# Patient Record
Sex: FEMALE | URBAN - METROPOLITAN AREA
[De-identification: names, ages, dates, MRNs, and addresses within clinical notes are randomized per-mention and may not be internally consistent; named-entity substitution may affect disease eponyms.]

---

## 2024-02-27 ENCOUNTER — APPOINTMENT (RX ONLY)
Dept: URBAN - METROPOLITAN AREA CLINIC 292 | Facility: CLINIC | Age: 29
Setting detail: DERMATOLOGY
End: 2024-02-27

## 2024-02-27 DIAGNOSIS — D22 MELANOCYTIC NEVI: ICD-10-CM

## 2024-02-27 DIAGNOSIS — L81.4 OTHER MELANIN HYPERPIGMENTATION: ICD-10-CM | Status: STABLE

## 2024-02-27 DIAGNOSIS — L21.8 OTHER SEBORRHEIC DERMATITIS: ICD-10-CM

## 2024-02-27 DIAGNOSIS — Z80.8 FAMILY HISTORY OF MALIGNANT NEOPLASM OF OTHER ORGANS OR SYSTEMS: ICD-10-CM

## 2024-02-27 PROBLEM — D22.62 MELANOCYTIC NEVI OF LEFT UPPER LIMB, INCLUDING SHOULDER: Status: ACTIVE | Noted: 2024-02-27

## 2024-02-27 PROCEDURE — ? PRESCRIPTION

## 2024-02-27 PROCEDURE — ? ADDITIONAL NOTES

## 2024-02-27 PROCEDURE — ? FULL BODY SKIN EXAM

## 2024-02-27 PROCEDURE — 99204 OFFICE O/P NEW MOD 45 MIN: CPT

## 2024-02-27 PROCEDURE — ? COUNSELING

## 2024-02-27 PROCEDURE — ? TREATMENT REGIMEN

## 2024-02-27 RX ORDER — FLUOCINONIDE 0.5 MG/ML
0.05% SOLUTION TOPICAL DAILY PRN
Qty: 60 | Refills: 2 | Status: ERX | COMMUNITY
Start: 2024-02-27

## 2024-02-27 RX ADMIN — FLUOCINONIDE 0.05%: 0.5 SOLUTION TOPICAL at 00:00

## 2024-02-27 ASSESSMENT — LOCATION SIMPLE DESCRIPTION DERM
LOCATION SIMPLE: LEFT UPPER BACK
LOCATION SIMPLE: LEFT FOREARM
LOCATION SIMPLE: ANTERIOR SCALP

## 2024-02-27 ASSESSMENT — LOCATION DETAILED DESCRIPTION DERM
LOCATION DETAILED: LEFT PROXIMAL DORSAL FOREARM
LOCATION DETAILED: MID-FRONTAL SCALP
LOCATION DETAILED: LEFT SUPERIOR UPPER BACK

## 2024-02-27 ASSESSMENT — LOCATION ZONE DERM
LOCATION ZONE: ARM
LOCATION ZONE: SCALP
LOCATION ZONE: TRUNK

## 2024-02-27 NOTE — HPI: EVALUATION OF SKIN LESION(S)
What Type Of Note Output Would You Prefer (Optional)?: Standard Output
Hpi Title: Evaluation of Skin Lesions
How Severe Are Your Spot(S)?: mild
Have Your Spot(S) Been Treated In The Past?: has not been treated
Family Member: Mother, Father, sister

## 2024-02-27 NOTE — PROCEDURE: ADDITIONAL NOTES
Render Risk Assessment In Note?: no
Additional Notes: Per patient, parent with history of NMSC and melanoma
Detail Level: Simple